# Patient Record
Sex: MALE | Race: OTHER
[De-identification: names, ages, dates, MRNs, and addresses within clinical notes are randomized per-mention and may not be internally consistent; named-entity substitution may affect disease eponyms.]

---

## 2018-02-11 ENCOUNTER — HOSPITAL ENCOUNTER (EMERGENCY)
Dept: HOSPITAL 56 - MW.ED | Age: 28
Discharge: HOME | End: 2018-02-11
Payer: SELF-PAY
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
## 2018-02-11 DIAGNOSIS — M25.511: ICD-10-CM

## 2018-02-11 DIAGNOSIS — F17.210: ICD-10-CM

## 2018-02-11 DIAGNOSIS — S20.419A: Primary | ICD-10-CM

## 2018-02-11 DIAGNOSIS — H11.31: ICD-10-CM
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
